# Patient Record
Sex: FEMALE | Race: OTHER | HISPANIC OR LATINO | Employment: UNEMPLOYED | URBAN - METROPOLITAN AREA
[De-identification: names, ages, dates, MRNs, and addresses within clinical notes are randomized per-mention and may not be internally consistent; named-entity substitution may affect disease eponyms.]

---

## 2023-06-16 ENCOUNTER — HOSPITAL ENCOUNTER (EMERGENCY)
Facility: HOSPITAL | Age: 23
Discharge: HOME | End: 2023-06-16
Attending: EMERGENCY MEDICINE

## 2023-06-16 VITALS
OXYGEN SATURATION: 100 % | TEMPERATURE: 98.4 F | SYSTOLIC BLOOD PRESSURE: 124 MMHG | HEART RATE: 79 BPM | WEIGHT: 146.61 LBS | DIASTOLIC BLOOD PRESSURE: 77 MMHG | RESPIRATION RATE: 20 BRPM

## 2023-06-16 DIAGNOSIS — R09.81 SINUS CONGESTION: Primary | ICD-10-CM

## 2023-06-16 PROCEDURE — 99282 EMERGENCY DEPT VISIT SF MDM: CPT

## 2023-06-16 RX ORDER — PSEUDOEPHEDRINE HCL 30 MG
30 TABLET ORAL EVERY 6 HOURS PRN
Qty: 22 TABLET | Refills: 0 | Status: SHIPPED | OUTPATIENT
Start: 2023-06-16

## 2023-06-16 NOTE — ED PROVIDER NOTES
History  Chief Complaint   Patient presents with   • Earache     B/l ear pain since yesterday     Patient is a 82-year-old female who presents with friend with a 2 day h/o b/l ear pain  Pressure  Subjective fever last night  No cough  +congestion  Took tylenol and an amoxicillin she had at home yesterday with little relief  No n/v/d  No myalgias  No sore throat  Pain is constant, nonradiating  None       History reviewed  No pertinent past medical history  History reviewed  No pertinent surgical history  History reviewed  No pertinent family history  I have reviewed and agree with the history as documented  E-Cigarette/Vaping     E-Cigarette/Vaping Substances     Social History     Tobacco Use   • Smoking status: Never   • Smokeless tobacco: Never       Review of Systems   Constitutional: Positive for fever  HENT: Positive for congestion, ear pain and sinus pressure  Negative for ear discharge  Eyes: Negative  Respiratory: Negative  Cardiovascular: Negative  Gastrointestinal: Negative  Endocrine: Negative  Genitourinary: Negative  Musculoskeletal: Negative  Skin: Negative  Allergic/Immunologic: Negative  Neurological: Negative  Hematological: Negative  Psychiatric/Behavioral: Negative  All other systems reviewed and are negative  Physical Exam  Physical Exam  Vitals and nursing note reviewed  Constitutional:       Appearance: Normal appearance  She is normal weight  HENT:      Head: Normocephalic and atraumatic  Right Ear: Tympanic membrane, ear canal and external ear normal  There is no impacted cerumen  Left Ear: Tympanic membrane, ear canal and external ear normal  There is no impacted cerumen  Nose: Congestion present  Mouth/Throat:      Mouth: Mucous membranes are moist       Pharynx: Oropharynx is clear     Eyes:      Conjunctiva/sclera: Conjunctivae normal       Pupils: Pupils are equal, round, and reactive to light  Cardiovascular:      Rate and Rhythm: Normal rate  Pulses: Normal pulses  Heart sounds: Normal heart sounds  Pulmonary:      Effort: Pulmonary effort is normal       Breath sounds: Normal breath sounds  Musculoskeletal:      Cervical back: Normal range of motion and neck supple  No tenderness  Lymphadenopathy:      Cervical: No cervical adenopathy  Skin:     General: Skin is warm and dry  Capillary Refill: Capillary refill takes less than 2 seconds  Neurological:      General: No focal deficit present  Mental Status: She is alert and oriented to person, place, and time  Psychiatric:         Mood and Affect: Mood normal          Behavior: Behavior normal          Vital Signs  ED Triage Vitals [06/16/23 1107]   Temperature Pulse Respirations Blood Pressure SpO2   98 4 °F (36 9 °C) 79 20 124/77 100 %      Temp Source Heart Rate Source Patient Position - Orthostatic VS BP Location FiO2 (%)   Oral Monitor Sitting Left arm --      Pain Score       --           Vitals:    06/16/23 1107   BP: 124/77   Pulse: 79   Patient Position - Orthostatic VS: Sitting         Visual Acuity      ED Medications  Medications - No data to display    Diagnostic Studies  Results Reviewed     None                 No orders to display              Procedures  Procedures         ED Course                               SBIRT 22yo+    Flowsheet Row Most Recent Value   Initial Alcohol Screen: US AUDIT-C     1  How often do you have a drink containing alcohol? 0 Filed at: 06/16/2023 1106   2  How many drinks containing alcohol do you have on a typical day you are drinking? 0 Filed at: 06/16/2023 1106   3a  Male UNDER 65: How often do you have five or more drinks on one occasion? 0 Filed at: 06/16/2023 1106   3b  FEMALE Any Age, or MALE 65+: How often do you have 4 or more drinks on one occassion?  0 Filed at: 06/16/2023 1106   Audit-C Score 0 Filed at: 06/16/2023 1106   DANA: How many times in the past year have you    Used an illegal drug or used a prescription medication for non-medical reasons? Never Filed at: 06/16/2023 1106                    Medical Decision Making  Sinus congestion: acute illness or injury     Details: 2 days of symptoms  no fever here  no mucus in nares, +sinus ttp, most likely sinus congestion  b/l OM unlikely  TMs normal b/l  Amount and/or Complexity of Data Reviewed  Independent Historian: friend      Risk  OTC drugs  Disposition  Final diagnoses:   Sinus congestion     Time reflects when diagnosis was documented in both MDM as applicable and the Disposition within this note     Time User Action Codes Description Comment    6/16/2023 11:06 AM Carlin Starr Add [R09 81] Sinus congestion       ED Disposition     ED Disposition   Discharge    Condition   Stable    Date/Time   Fri Jun 16, 2023 11:06 AM    Comment   Kendall Good discharge to home/self care  Follow-up Information     Follow up With Specialties Details Why Contact Info Additional [address]   [address], [address] [employer] [id number] [place] [address], [address], [address] [place], [place], [address] [place] [address]          Patient's Medications   Discharge Prescriptions    PSEUDOEPHEDRINE (SUDAFED) 30 MG TABLET    Take 1 tablet (30 mg total) by mouth every 6 (six) hours as needed for congestion       Start Date: 6/16/2023 End Date: --       Order Dose: 30 mg       Quantity: 22 tablet    Refills: 0       No discharge procedures on file      PDMP Review     None          ED Provider  Electronically Signed by           Dianne Du MD  06/16/23 2080